# Patient Record
Sex: FEMALE | Race: ASIAN | NOT HISPANIC OR LATINO | URBAN - METROPOLITAN AREA
[De-identification: names, ages, dates, MRNs, and addresses within clinical notes are randomized per-mention and may not be internally consistent; named-entity substitution may affect disease eponyms.]

---

## 2017-08-06 ENCOUNTER — EMERGENCY (EMERGENCY)
Facility: HOSPITAL | Age: 67
LOS: 1 days | Discharge: HOME | End: 2017-08-06
Admitting: EMERGENCY MEDICINE
Payer: COMMERCIAL

## 2017-08-06 VITALS
SYSTOLIC BLOOD PRESSURE: 131 MMHG | RESPIRATION RATE: 20 BRPM | OXYGEN SATURATION: 100 % | DIASTOLIC BLOOD PRESSURE: 91 MMHG | HEART RATE: 99 BPM

## 2017-08-06 PROCEDURE — 71020: CPT | Mod: 26

## 2017-08-06 PROCEDURE — 71101 X-RAY EXAM UNILAT RIBS/CHEST: CPT | Mod: 26,LT

## 2017-08-06 PROCEDURE — 99284 EMERGENCY DEPT VISIT MOD MDM: CPT

## 2017-08-06 NOTE — ED PROVIDER NOTE - PLAN OF CARE
Rest, stay hydrated, apply ice packs to affected area. Take tylenol over the counter as directed for pain. Return to ED for any worsening symptoms including worsening pain, swelling, redness from wound.

## 2017-08-06 NOTE — ED PROVIDER NOTE - CARE PLAN
Principal Discharge DX:	Hematoma  Instructions for follow-up, activity and diet:	Rest, stay hydrated, apply ice packs to affected area. Take tylenol over the counter as directed for pain. Return to ED for any worsening symptoms including worsening pain, swelling, redness from wound.
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
## 2017-08-06 NOTE — ED PROVIDER NOTE - SKIN BREAST LEFT
no discharge/2x2cm hematoma in right upper quadrant of left breatst with srounding ecchymosis of varying color indicating wound healing, hematoma ttp, soft, nonfixed, no fluctuance suspicious for abscess, no erythema or retraction on breast, no drainage from nipple/MASS/LUMP/TENDER
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
